# Patient Record
Sex: MALE | Race: BLACK OR AFRICAN AMERICAN | Employment: UNEMPLOYED | URBAN - METROPOLITAN AREA
[De-identification: names, ages, dates, MRNs, and addresses within clinical notes are randomized per-mention and may not be internally consistent; named-entity substitution may affect disease eponyms.]

---
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
## 2024-01-16 ENCOUNTER — HOSPITAL ENCOUNTER (EMERGENCY)
Age: 19
Discharge: HOME | End: 2024-01-16
Payer: COMMERCIAL

## 2024-01-16 VITALS
RESPIRATION RATE: 16 BRPM | BODY MASS INDEX: 21.96 KG/M2 | TEMPERATURE: 97.7 F | DIASTOLIC BLOOD PRESSURE: 78 MMHG | HEIGHT: 74 IN | WEIGHT: 171.08 LBS | HEART RATE: 57 BPM | OXYGEN SATURATION: 99 % | SYSTOLIC BLOOD PRESSURE: 129 MMHG

## 2024-01-16 DIAGNOSIS — J10.1 INFLUENZA B: Primary | ICD-10-CM

## 2024-01-16 LAB
FLUAV RNA UPPER RESP QL NAA+PROBE: NEGATIVE
FLUBV AG NPH QL: POSITIVE
S PYO AG THROAT QL: NEGATIVE
SARS-COV-2 RDRP RESP QL NAA+PROBE: NOT DETECTED

## 2024-01-16 PROCEDURE — 87804 INFLUENZA ASSAY W/OPTIC: CPT

## 2024-01-16 PROCEDURE — 6370000000 HC RX 637 (ALT 250 FOR IP): Performed by: PHYSICIAN ASSISTANT

## 2024-01-16 PROCEDURE — 87635 SARS-COV-2 COVID-19 AMP PRB: CPT

## 2024-01-16 PROCEDURE — 87081 CULTURE SCREEN ONLY: CPT

## 2024-01-16 PROCEDURE — 99283 EMERGENCY DEPT VISIT LOW MDM: CPT

## 2024-01-16 PROCEDURE — 87880 STREP A ASSAY W/OPTIC: CPT

## 2024-01-16 RX ORDER — LANOLIN ALCOHOL/MO/W.PET/CERES
3 CREAM (GRAM) TOPICAL DAILY
COMMUNITY

## 2024-01-16 RX ORDER — ACETAMINOPHEN 500 MG
1000 TABLET ORAL ONCE
Status: COMPLETED | OUTPATIENT
Start: 2024-01-16 | End: 2024-01-16

## 2024-01-16 RX ORDER — IBUPROFEN 800 MG/1
800 TABLET ORAL ONCE
Status: COMPLETED | OUTPATIENT
Start: 2024-01-16 | End: 2024-01-16

## 2024-01-16 RX ADMIN — IBUPROFEN 800 MG: 800 TABLET, FILM COATED ORAL at 19:20

## 2024-01-16 RX ADMIN — ACETAMINOPHEN 1000 MG: 500 TABLET ORAL at 19:20

## 2024-01-16 ASSESSMENT — LIFESTYLE VARIABLES
HOW OFTEN DO YOU HAVE A DRINK CONTAINING ALCOHOL: NEVER
HOW MANY STANDARD DRINKS CONTAINING ALCOHOL DO YOU HAVE ON A TYPICAL DAY: PATIENT DOES NOT DRINK

## 2024-01-16 ASSESSMENT — PAIN DESCRIPTION - LOCATION: LOCATION: THROAT

## 2024-01-16 ASSESSMENT — PAIN SCALES - GENERAL
PAINLEVEL_OUTOF10: 0
PAINLEVEL_OUTOF10: 3

## 2024-01-16 NOTE — ED PROVIDER NOTES
[facility]  EMERGENCY DEPARTMENT ENCOUNTER        Pt Name: Anna Mejias  MRN: [id number]  Birthdate 2005  Date of evaluation: 1/16/2024  Provider: K[facility] March  PCP: No primary care provider on file.  Note Started: 6:53 PM EST 1/16/24      ANNA. I have evaluated this patient.        CHIEF COMPLAINT       Chief Complaint   Patient presents with    Pharyngitis       HISTORY OF PRESENT ILLNESS: 1 or more Elements     History from : Patient    Limitations to history : None    Anna Mejias is a 18 y.o. male who presents via private vehicle from his home for evaluation of sore throat that started on Saturday.  He notes that around the same time he started having bodyaches and chills.  He endorses little bit of nasal congestion no significant rhinorrhea, he has had intermittent headaches.  He denies cough chest pain shortness of breath abdominal pain nausea vomiting diarrhea although he does note that on the first day he did have 1 episode of nonbloody emesis.  He has been taking over-the-counter medicines for his symptoms, Tylenol and ibuprofen with interval improvement.  He notes that he was supposed to go to work today and he just felt so tired that he decided to come and be evaluated instead.  He last took antipyretic at 4 PM.  He otherwise is healthy up-to-date on his immunizations has no chronic medical problems and no other acute concerns or complaints at this time.    Nursing Notes were all reviewed and agreed with or any disagreements were addressed in the HPI.    REVIEW OF SYSTEMS :      Review of Systems    Positives and Pertinent negatives as per HPI.     SURGICAL HISTORY   History reviewed. No pertinent surgical history.    CURRENTMEDICATIONS       Previous Medications    MELATONIN 3 MG TABS TABLET    Take 1 tablet by mouth daily       ALLERGIES     Patient has no known allergies.    FAMILYHISTORY     History reviewed. No pertinent family history.     SOCIAL HISTORY

## 2024-01-17 NOTE — ED NOTES
Pt cleared to discharge, Departure Condition was Good, work and school note was given to pt  as he requested that. Pt  discharge Ambulated. Discharge instructions reviewed; Follow-up care advised; Pain management discussed; Medications discussed; Patient verbalized understanding.

## 2024-01-17 NOTE — DISCHARGE INSTRUCTIONS
INFLUENZA    Influenza, or the flu, is caused by a virus which produces symptoms such as headaches, muscle aches, sore throat, congestion, and/or fever (often as high as 104° F).  If the virus spreads into your lungs and airways, it causes a dry hacking cough and/or chest pain.  The flu symptoms usually last from 3 to 5 days, and without complications you should recover within 7-10 days.    If you have lung or heart disease, you have a greater chance of developing pneumonia.  Persons with chronic medical problems should be carefully watched by their doctor while they have the flu.  Patients with chronic conditions, elderly patients and the very young should receive an annual flu vaccine.    Home Care:    There is NO medication that will kill the flu virus.  Antiviral medications, (eg. Tamiflu, Relenza), may shortent the course of illness by 1 day if started within the first 1-2 days. Your body will heal itself by recognizing the flu virus and destroying it to aid your body in this healing process.  It is important that you rest and drink plenty of fluids (especially if you have fever).  Use a vaporizer to soothe raw airways.    Use medication only if they help relieve your symptoms.  Pain and fever is best treated with over-the-counter remedies such as:        * Aspirin (for adults only)      * Ibuprofen (Advil, Nuprin) Take with food.      * Acetaminophen (Tylenol)    1. Over-the-counter remedies such as decongestants and cough syrups may help relieve stuffy nose and cough symptoms.  Be sure to follow the directions and read the label for potential side-effects.    2. Stop smoking and avoid smoke-filled rooms.  Smoke can irritate your airways, prolong your illness, and increase your chances for complications.    3. Your doctor may prescribe an antibiotic if he/she is concerned about the possibility that a bacterial infection may develop.    Call your doctor or return to the Emergency Department if the following

## 2024-01-19 LAB — S PYO THROAT QL CULT: NORMAL

## 2025-03-19 ENCOUNTER — HOSPITAL ENCOUNTER (EMERGENCY)
Age: 20
Discharge: HOME | End: 2025-03-19
Attending: STUDENT IN AN ORGANIZED HEALTH CARE EDUCATION/TRAINING PROGRAM

## 2025-03-19 VITALS
WEIGHT: 164.02 LBS | HEIGHT: 75 IN | RESPIRATION RATE: 18 BRPM | TEMPERATURE: 98.4 F | SYSTOLIC BLOOD PRESSURE: 129 MMHG | OXYGEN SATURATION: 100 % | DIASTOLIC BLOOD PRESSURE: 59 MMHG | HEART RATE: 77 BPM | BODY MASS INDEX: 20.39 KG/M2

## 2025-03-19 DIAGNOSIS — R05.1 ACUTE COUGH: ICD-10-CM

## 2025-03-19 DIAGNOSIS — J02.9 PHARYNGITIS, UNSPECIFIED ETIOLOGY: Primary | ICD-10-CM

## 2025-03-19 LAB
FLUAV RNA UPPER RESP QL NAA+PROBE: NEGATIVE
FLUBV AG NPH QL: NEGATIVE
S PYO AG THROAT QL: NEGATIVE
SARS-COV-2 RDRP RESP QL NAA+PROBE: NOT DETECTED

## 2025-03-19 PROCEDURE — 6370000000 HC RX 637 (ALT 250 FOR IP): Performed by: STUDENT IN AN ORGANIZED HEALTH CARE EDUCATION/TRAINING PROGRAM

## 2025-03-19 PROCEDURE — 99283 EMERGENCY DEPT VISIT LOW MDM: CPT

## 2025-03-19 PROCEDURE — 87635 SARS-COV-2 COVID-19 AMP PRB: CPT

## 2025-03-19 PROCEDURE — 87880 STREP A ASSAY W/OPTIC: CPT

## 2025-03-19 PROCEDURE — 87804 INFLUENZA ASSAY W/OPTIC: CPT

## 2025-03-19 RX ORDER — ACETAMINOPHEN 325 MG/1
650 TABLET ORAL EVERY 6 HOURS PRN
Qty: 30 TABLET | Refills: 0 | Status: SHIPPED | OUTPATIENT
Start: 2025-03-19

## 2025-03-19 RX ORDER — ACETAMINOPHEN 500 MG
1000 TABLET ORAL ONCE
Status: COMPLETED | OUTPATIENT
Start: 2025-03-19 | End: 2025-03-19

## 2025-03-19 RX ORDER — IBUPROFEN 600 MG/1
600 TABLET, FILM COATED ORAL EVERY 6 HOURS PRN
Qty: 30 TABLET | Refills: 0 | Status: SHIPPED | OUTPATIENT
Start: 2025-03-19

## 2025-03-19 RX ORDER — IBUPROFEN 800 MG/1
800 TABLET, FILM COATED ORAL ONCE
Status: COMPLETED | OUTPATIENT
Start: 2025-03-19 | End: 2025-03-19

## 2025-03-19 RX ADMIN — IBUPROFEN 800 MG: 800 TABLET, FILM COATED ORAL at 18:32

## 2025-03-19 RX ADMIN — ACETAMINOPHEN 1000 MG: 500 TABLET ORAL at 18:32

## 2025-03-19 ASSESSMENT — PAIN DESCRIPTION - LOCATION
LOCATION: GENERALIZED
LOCATION: GENERALIZED

## 2025-03-19 ASSESSMENT — PAIN SCALES - GENERAL
PAINLEVEL_OUTOF10: 8
PAINLEVEL_OUTOF10: 8
PAINLEVEL_OUTOF10: 5

## 2025-03-19 ASSESSMENT — PAIN DESCRIPTION - PAIN TYPE: TYPE: ACUTE PAIN

## 2025-03-19 ASSESSMENT — LIFESTYLE VARIABLES
HOW MANY STANDARD DRINKS CONTAINING ALCOHOL DO YOU HAVE ON A TYPICAL DAY: PATIENT DOES NOT DRINK
HOW OFTEN DO YOU HAVE A DRINK CONTAINING ALCOHOL: NEVER

## 2025-03-19 ASSESSMENT — PAIN - FUNCTIONAL ASSESSMENT
PAIN_FUNCTIONAL_ASSESSMENT: 0-10
PAIN_FUNCTIONAL_ASSESSMENT: 0-10

## 2025-03-19 NOTE — ED NOTES
Discharge and education instructions reviewed. Patient verbalized understanding, teach-back successful. Patient denied questions at this time. No acute distress noted. Patient instructed to follow-up as noted - return to emergency department if symptoms worsen. Patient verbalized understanding. Discharged per EDMD with discharge instructions.   
regular

## 2025-03-19 NOTE — DISCHARGE INSTRUCTIONS
Please follow-up primary care doctor.  Please take Tylenol ibuprofen as needed for pain control.  If you have acute worsening symptoms please come back to the emergency department

## 2025-03-19 NOTE — ED TRIAGE NOTES
Pt presents with pharyngitis x3days, with nasal congestion, body aches, and tiredness. Denies pain in throat currently, reports generalized body aches 8/10.

## 2025-03-21 NOTE — ED PROVIDER NOTES
[facility] EMERGENCY DEPARTMENT    CHIEF COMPLAINT  Pharyngitis (Pt presents with pharyngitis x3days, with nasal congestion, body aches, and tiredness. Denies pain in throat currently, reports generalized body aches 8/10. )       HISTORY OF PRESENT ILLNESS  Anna Mejias is a 19 y.o. male presenting to the ED for sore throat.  Patient also reports body aches, fatigue, nasal congestion.  Patient denies chest pain or shortness of breath.  Patient denies fever.    - History obtained from: Patient   - Limitations to history: none    I have reviewed the following from the nursing documentation:    History reviewed. No pertinent past medical history.  History reviewed. No pertinent surgical history.  History reviewed. No pertinent family history.  Social History     Socioeconomic History    Marital status: Single     Spouse name: Not on file    Number of children: Not on file    Years of education: Not on file    Highest education level: Not on file   Occupational History    Not on file   Tobacco Use    Smoking status: Never    Smokeless tobacco: Never   Vaping Use    Vaping status: Never Used   Substance and Sexual Activity    Alcohol use: Never    Drug use: Never    Sexual activity: Not Currently   Other Topics Concern    Not on file   Social History Narrative    Not on file     Social Drivers of Health     Financial Resource Strain: Not on File (8/24/2019)    Received from [facility]    Financial Resource Strain     Financial Resource Strain: 0   Food Insecurity: Unknown (1/20/2024)    Received from [facility]    Food Insecurities     Worried about running out of food: Not on file     Food Bought: Not on file   Transportation Needs: Unknown (1/20/2024)    Received from [facility]    Transportation     Worried about transportation: Not on file   Physical Activity: Not on File (8/24/2019)    Received from [facility]    Physical Activity     Physical